# Patient Record
Sex: FEMALE | Race: BLACK OR AFRICAN AMERICAN | NOT HISPANIC OR LATINO | Employment: OTHER | URBAN - METROPOLITAN AREA
[De-identification: names, ages, dates, MRNs, and addresses within clinical notes are randomized per-mention and may not be internally consistent; named-entity substitution may affect disease eponyms.]

---

## 2018-11-19 ENCOUNTER — HOSPITAL ENCOUNTER (EMERGENCY)
Facility: HOSPITAL | Age: 53
Discharge: LEFT BEFORE DISCHARGE | End: 2018-11-19
Attending: EMERGENCY MEDICINE
Payer: OTHER GOVERNMENT

## 2018-11-19 VITALS
OXYGEN SATURATION: 97 % | RESPIRATION RATE: 18 BRPM | TEMPERATURE: 98 F | BODY MASS INDEX: 37.21 KG/M2 | SYSTOLIC BLOOD PRESSURE: 138 MMHG | WEIGHT: 210 LBS | DIASTOLIC BLOOD PRESSURE: 77 MMHG | HEIGHT: 63 IN | HEART RATE: 93 BPM

## 2018-11-19 DIAGNOSIS — Z48.89 ENCOUNTER FOR POSTOPERATIVE WOUND CARE: Primary | ICD-10-CM

## 2018-11-19 PROCEDURE — 99282 EMERGENCY DEPT VISIT SF MDM: CPT

## 2018-11-19 PROCEDURE — 99282 EMERGENCY DEPT VISIT SF MDM: CPT | Mod: ,,, | Performed by: EMERGENCY MEDICINE

## 2018-11-19 NOTE — ED TRIAGE NOTES
Presents to ER due to drainage coming from FLORINA drain site RLQ s/p tummy tuck on 10/30/18.  First called to see her surgeon but was sent here by him instead.  Patient's name and date of birth checked and is correct.    LOC: The patient is awake, alert, and oriented to place, time, situation. Affect is appropriate.  Speech is appropriate and clear.      APPEARANCE: Patient resting comfortably, reporting palpation, light headedness,  in no acute distress.  Patient is clean and well groomed.     SKIN: The skin is warm and dry; color consistent with ethnicity.  Patient has normal skin turgor and moist mucus membranes.  Skin intact; no breakdown or bruising noted.      MUSCULOSKELETAL: Patient moving upper and lower extremities without difficulty.  Denies weakness.      RESPIRATORY: Airway is open and patent. Respirations spontaneous, even, easy, and non-labored.  Patient has a normal effort and rate.  No accessory muscle use noted. Denies cough.  BS clear.     CARDIAC:  No peripheral edema noted. No complaints of chest pain.       ABDOMEN: Firm.  FLORINA drains bilateral lower quadrant draining serousang drainage.  Approximately 10ml noted in each bulb.     NEUROLOGIC: Eyes open spontaneously.  Behavior appropriate to situation.  Follows commands; facial expression symmetrical.  Purposeful motor response noted; normal sensation in all extremities.

## 2018-11-20 NOTE — ED NOTES
Patient states she does not want PIV and wants to go home, states if we will not take out the drains she does not want labs, update to physician who states he will be in to see her.

## 2018-11-20 NOTE — ED PROVIDER NOTES
Encounter Date: 11/19/2018       History     Chief Complaint   Patient presents with    Multiple Complaints     had tummy tuck in [place] oct 30 , i have 2 drains and L hip pain and it's numb , drainage around drains and smells     Source of history:  Patient    The patient had a tummy tuck performed by a surgeon in [place] on 10/30/18. She has Jorge-Ng drains to both sides of her lower abdomen.  She has been having diminishing drainage since her operation.  She had normal postoperative pain from her surgery initially that has been gradually improving.  She was taking Percocet 5/325 immediately following her operation.  She now takes Tylenol as needed.  The last dose of Tylenol was 3 days ago.  She denies fever, chills, nausea and vomiting. She denies urinary symptoms. She is having discomfort to her abdomen at the sites of the FLORINA drains.  She contacted her surgeon in [place] and was instructed to come to the ED and have her drains removed.           Review of patient's allergies indicates:  No Known Allergies  History reviewed. No pertinent past medical history.  Past Surgical History:   Procedure Laterality Date    ABDOMINAL SURGERY      HYSTERECTOMY       History reviewed. No pertinent family history.  Social History     Tobacco Use    Smoking status: Current Some Day Smoker     Packs/day: 0.25     Years: 5.00     Pack years: 1.25     Types: Cigarettes    Smokeless tobacco: Never Used   Substance Use Topics    Alcohol use: Yes    Drug use: No     Review of Systems   Constitutional: Negative for chills and fever.   Eyes: Negative for visual disturbance.   Respiratory: Negative for shortness of breath.    Cardiovascular: Negative for chest pain.   Gastrointestinal: Negative for abdominal pain, blood in stool, constipation, diarrhea, nausea and vomiting.   Endocrine: Negative for polydipsia and polyuria.   Genitourinary: Negative for difficulty urinating, dysuria and frequency.   Musculoskeletal: Negative for  arthralgias and myalgias.   Skin:        (+) discomfort to skin of abdomen at sites of FLORINA drains   Neurological: Negative for dizziness, syncope, weakness, light-headedness, numbness and headaches.       Physical Exam     Initial Vitals [11/19/18 1643]   BP Pulse Resp Temp SpO2   138/77 93 18 98.4 °F (36.9 °C) 97 %      MAP       --         Physical Exam    Constitutional: She appears well-developed and well-nourished. She is not diaphoretic. No distress.   HENT:   Mouth/Throat: Oropharynx is clear and moist.   Eyes: Conjunctivae are normal. No scleral icterus.   Cardiovascular: Normal rate, regular rhythm and normal heart sounds. Exam reveals no gallop and no friction rub.    No murmur heard.  Pulmonary/Chest: Breath sounds normal. No respiratory distress. She has no wheezes. She has no rhonchi. She has no rales.   Abdominal: She exhibits no distension. There is no tenderness.   The skin to the lower abdominal wall is mildly hypertrophied and hyperpigmented. There are no lacerations, abrasions, or other skin lesions. The skin is not hot to touch. There is no tenderness to the skin. There are FLORINA drains to both sides of the lower abdomen. There is not erythema or drainage around the tubes at the insertion sites. Each suction bulb has less than 25 cc of serosanguinous drainage.   Musculoskeletal: Normal range of motion. She exhibits no edema or tenderness.   Neurological: She is alert and oriented to person, place, and time. She has normal strength. No cranial nerve deficit or sensory deficit. GCS eye subscore is 4. GCS verbal subscore is 5. GCS motor subscore is 6.   Skin: Skin is warm and dry. No pallor.         ED Course   Procedures  Labs Reviewed   CBC W/ AUTO DIFFERENTIAL   COMPREHENSIVE METABOLIC PANEL   URINALYSIS, REFLEX TO URINE CULTURE          Imaging Results    None          Medical Decision Making:   History:   Old Medical Records: I decided to obtain old medical records.  Medical decision making:  This  patient was evaluated for evaluation of bilateral FLORINA drains to her lower abdomen that have been in place since tummy tuck operation performed at an outside facility late last month.  The patient is requesting that the drain to be removed.  Her surgery was not performed here.  It was performed by a provider in [place].  She lives here in [place].  She traveled to [place] just to have the surgery performed.  She states that no follow-up appointment was made by the provider who performed the operation.  She was told that she could follow up with a physician here in [place] for postoperative care and eventual removal of the drains.  Today, she is afebrile with stable vital signs.  She has no clinical signs of dehydration.  Her abdomen is soft and nontender. She is continuing to have less than 25 cc of drainage from each site daily.  This has decreased significantly from the amount of drainage that she was having immediately following her surgery.  My plan of care was to obtain basic labs to make sure that she had no leukocytosis or other concerning findings for a serious acute infection and then having her follow up outpatient for definitive care.  The patient does not wish to have labs performed at this time.  She states that she will travel to [place] to have her issues addressed by the surgeon who performed the operation.                      Clinical Impression:   The encounter diagnosis was Encounter for postoperative wound care.      Disposition:   Disposition: AMA  Condition: Stable                        Tee Crawford III, MD  11/19/18 6005

## 2022-02-17 ENCOUNTER — OFFICE VISIT (OUTPATIENT)
Dept: GASTROENTEROLOGY | Facility: CLINIC | Age: 57
End: 2022-02-17
Payer: MEDICARE

## 2022-02-17 DIAGNOSIS — Z86.010 PERSONAL HISTORY OF COLONIC POLYPS: ICD-10-CM

## 2022-02-17 DIAGNOSIS — Z01.812 PRE-PROCEDURE LAB EXAM: ICD-10-CM

## 2022-02-17 DIAGNOSIS — D50.9 IRON DEFICIENCY ANEMIA, UNSPECIFIED IRON DEFICIENCY ANEMIA TYPE: Primary | ICD-10-CM

## 2022-02-17 PROCEDURE — 99202 OFFICE O/P NEW SF 15 MIN: CPT | Mod: PBBFAC,PO | Performed by: INTERNAL MEDICINE

## 2022-02-17 PROCEDURE — 99999 PR PBB SHADOW E&M-NEW PATIENT-LVL II: ICD-10-PCS | Mod: PBBFAC,,, | Performed by: INTERNAL MEDICINE

## 2022-02-17 PROCEDURE — 99204 PR OFFICE/OUTPT VISIT, NEW, LEVL IV, 45-59 MIN: ICD-10-PCS | Mod: S$PBB,,, | Performed by: INTERNAL MEDICINE

## 2022-02-17 PROCEDURE — 99999 PR PBB SHADOW E&M-NEW PATIENT-LVL II: CPT | Mod: PBBFAC,,, | Performed by: INTERNAL MEDICINE

## 2022-02-17 PROCEDURE — 99204 OFFICE O/P NEW MOD 45 MIN: CPT | Mod: S$PBB,,, | Performed by: INTERNAL MEDICINE

## 2022-02-17 RX ORDER — SODIUM, POTASSIUM,MAG SULFATES 17.5-3.13G
1 SOLUTION, RECONSTITUTED, ORAL ORAL DAILY
Qty: 1 KIT | Refills: 0 | Status: SHIPPED | OUTPATIENT
Start: 2022-02-17 | End: 2022-02-19

## 2022-02-17 RX ORDER — SODIUM, POTASSIUM,MAG SULFATES 17.5-3.13G
1 SOLUTION, RECONSTITUTED, ORAL ORAL DAILY
Qty: 1 KIT | Refills: 0 | OUTPATIENT
Start: 2022-02-17 | End: 2022-02-17

## 2022-02-17 NOTE — PATIENT INSTRUCTIONS
SUPREP Instructions    [facility] [address]  Clinic Office [phone]  Endoscopy Lab [phone]    You are scheduled for a Colonoscopy with Dr. Michelle  on 3/22/22 at [facility] in [place].    Check in at the Hospital -1st floor, Information desk.   Call [phone] to reschedule.     An adult friend/family member must come with you to drive you home.  You cannot drive, take a taxi, Uber/Lyft or bus to leave the Endoscopy Center alone.  If you do not have someone to drive you home, your test will be cancelled.      Please follow the directions of your doctor if you take any pills that thin your blood. If you take these meds: Aggrenox, Brilinta, Effient, Eliquis, Lovenox, Plavix, Pletal, Pradaxa, Ticilid, Xarelto or Coumadin, let the doctor's office know.     DON'T: On the morning of the test do not take insulin or pills for diabetes.      DO: On the morning of the test, do take any pills for blood pressure, heart, anti-rejection and or seizures with a small sip of water. Bring any inhalers with you.     To have a good prep, you must follow these instructions - please do not use the directions from the pharmacy.     The doctor will send a prescription for the SUPREP.      The Day Before the test:     You can only drink CLEAR LIQUIDS the whole day before your test.  You can't eat any food for the whole day.     You CAN have:  o Water, Coffee or decaf coffee (no milk or cream)  o Tea  o Soft drinks - regular and sugar free  o Jell-O (green or yellow)  o Apple Juice, grape juice, white cranberry juice  o Gatorade, Power Aid, Crystal Light, Shady Aid  o Lemonade and Limeade  o Bouillon, clear soup  o Snowball, popsicles  o YOU CAN'T DRINK ANYTHING RED  o YOU CAN'T DRINK ALCOHOL  ONLY DRINK WHAT IS ON THE LIST       At 5 pm the night before your test:    o Pour the 1st bottle of SUPREP into the cup provided in the box. Add water to the line on the cup and mix well.  Drink  the whole cup and then drink 2 more full cups of water over 1 hour.  - This can be easier to drink if it is cold. You can mix it 20 minutes ahead of time and place in the refrigerator before you drink it.  You must drink it within 30-45 minutes of mixing it.  Do NOT pour the drink over ice.  You can drink it with a straw.    The Day of the test - We will call you 2 days before your test to tell you what time to get there.     5 hours before you come to the hospital (this may be in the middle of the night)  o Pour the 2nd bottle of SUPREP into the cup provided in the box. Add water to the line on the cup and mix well.  Drink the whole cup and then drink 2 more full cups of water over 1 hour.  - It might be easier to drink if it is cold. You can mix it 20 minutes ahead of time and place in the refrigerator before you drink it.  You must drink it within 30-45 minutes of mixing it.  Do NOT pour the drink over ice.  You can drink it with a straw.    o YOU CAN'T EAT OR DRINK ANYTHING ELSE ONCE YOU FINISH THE PREP    o Leave all valuables and jewelry at home. You will be at the hospital for 2-4 hours.    o Call the Endoscopy department at [phone] with any questions about your procedure.

## 2022-02-17 NOTE — PROGRESS NOTES
GASTROENTEROLOGY CLINIC NOTE    Reason for visit: The primary encounter diagnosis was Iron deficiency anemia, unspecified iron deficiency anemia type. Diagnoses of Personal history of colonic polyps and Pre-procedure lab exam were also pertinent to this visit.  Referring provider/PCP: 's Administration (Inactive)    HPI:  Lynda Escobar is a 56 y.o. female here today for here as [facility] for [facility]. New patient.    Ongoing anemia. hgb reportedly around 10, from about 8 months ago it was near 12. Records reviewed that were scanned into media. No abd pain, No blood in stool. No other bleeding sources. She notes intermittent bilateral flank pain, no specific timing. No problems with appetite, no radiating symptoms.  She is wanting to undergo tummy tuck surgery.        Prior Endoscopy:  EGD: none  Colon:  She thinks about 3 or more years ago and was told polyps    (Portions of this note were dictated using voice recognition software and may contain dictation related errors in spelling/grammar/syntax not found on text review)    Review of Systems   Constitutional: Negative for fever and malaise/fatigue.   Respiratory: Negative for cough and shortness of breath.    Cardiovascular: Negative for chest pain and palpitations.   Gastrointestinal: Negative for abdominal pain, blood in stool, nausea and vomiting.   Neurological: Negative for dizziness and headaches.       Past Medical History: has no past medical history on file.    Past Surgical History: has a past surgical history that includes Abdominal surgery and Hysterectomy.    Family History:family history is not on file.    Allergies: Review of patient's allergies indicates:  No Known Allergies    Social History: reports that she has been smoking cigarettes. She has a 1.25 pack-year smoking history. She has never used smokeless tobacco. She reports current alcohol use. She reports that she does not use drugs.    Home medications:   Current Outpatient  Medications on File Prior to Visit   Medication Sig Dispense Refill    potassium chloride SA (K-DUR,KLOR-CON) 10 MEQ tablet Take 20 mEq by mouth once daily.      amlodipine (NORVASC) 5 MG tablet Take 5 mg by mouth once daily.      hydrochlorothiazide (HYDRODIURIL) 12.5 MG Tab Take 12.5 mg by mouth once daily.      pantoprazole (PROTONIX) 40 MG tablet Take 40 mg by mouth once daily.       No current facility-administered medications on file prior to visit.       Vital signs:  There were no vitals taken for this visit.    Physical Exam  Vitals reviewed.   Constitutional:       General: She is not in acute distress.  HENT:      Head: Normocephalic and atraumatic.   Eyes:      General: No scleral icterus.     Conjunctiva/sclera: Conjunctivae normal.   Skin:     General: Skin is warm.      Coloration: Skin is not pale.      Findings: No rash.   Neurological:      Mental Status: She is oriented to person, place, and time.      Gait: Gait normal.   Psychiatric:         Mood and Affect: Mood normal.         Behavior: Behavior normal.         I have reviewed prior labs, imaging, notes from last month    Assessment:  1. Iron deficiency anemia, unspecified iron deficiency anemia type    2. Personal history of colonic polyps    3. Pre-procedure lab exam        Plan:  Orders Placed This Encounter    COVID-19 Routine Screening    sodium,potassium,mag sulfates (SUPREP BOWEL PREP KIT) 17.5-3.13-1.6 gram SolR    Case Request Endoscopy: EGD (ESOPHAGOGASTRODUODENOSCOPY), COLONOSCOPY     egd and colon  egd for iron def  Colon for hx of polyps    Advised her to have her PCP check iron and routine CBC in future.  Advised her to return to PCP for further eval of bilateral flank pain and ongoing kidney issues.    RTC     Mundo Michelle MD  [facility]

## 2022-03-15 ENCOUNTER — TELEPHONE (OUTPATIENT)
Dept: GASTROENTEROLOGY | Facility: CLINIC | Age: 57
End: 2022-03-15
Payer: OTHER GOVERNMENT

## 2022-03-15 NOTE — TELEPHONE ENCOUNTER
----- Message from Irma Choudhary sent at 3/15/2022 12:15 PM CDT -----  Needs advice from nurse:      Who Called:pt  Regarding:[facility] is saying you need to request that the colonoscopy is needed/please call [facility] at /patient is scheduled for 3/22  Would the patient rather a call back or [facility]IA [facility]?  Best Call Back number:[phone]  Additional Info:      
Unable to reach [facility].   
Former smoker

## 2022-03-18 ENCOUNTER — TELEPHONE (OUTPATIENT)
Dept: GASTROENTEROLOGY | Facility: HOSPITAL | Age: 57
End: 2022-03-18
Payer: OTHER GOVERNMENT

## 2022-03-18 ENCOUNTER — TELEPHONE (OUTPATIENT)
Dept: ENDOSCOPY | Facility: HOSPITAL | Age: 57
End: 2022-03-18
Payer: OTHER GOVERNMENT

## 2022-03-18 DIAGNOSIS — Z86.010 PERSONAL HISTORY OF COLONIC POLYPS: Primary | ICD-10-CM

## 2022-03-18 RX ORDER — POLYETHYLENE GLYCOL 3350, SODIUM SULFATE, SODIUM CHLORIDE, POTASSIUM CHLORIDE, SODIUM ASCORBATE, AND ASCORBIC ACID 7.5-2.691G
2000 KIT ORAL ONCE
Qty: 1 KIT | Refills: 0 | Status: SHIPPED | OUTPATIENT
Start: 2022-03-18 | End: 2022-03-18

## 2022-03-18 NOTE — TELEPHONE ENCOUNTER
unable to get prep will speak to Dr. Michelle and fax RX today and reach out to patient on Monday

## 2022-09-02 ENCOUNTER — TELEPHONE (OUTPATIENT)
Dept: ORTHOPEDICS | Facility: CLINIC | Age: 57
End: 2022-09-02
Payer: OTHER GOVERNMENT

## 2022-09-02 NOTE — TELEPHONE ENCOUNTER
I called Mrs Escobar @ 10 : 30 am  , she had a 10 am appt with Dr Yuen   she thought it was on Sept. 4th  she was rescheduled to 10/14 she was sorry she missed it , no problem

## 2022-09-29 ENCOUNTER — TELEPHONE (OUTPATIENT)
Dept: ORTHOPEDICS | Facility: CLINIC | Age: 57
End: 2022-09-29
Payer: OTHER GOVERNMENT

## 2022-09-29 NOTE — TELEPHONE ENCOUNTER
We spoke   informed her Oct 14th   appointment with Dr Yuen has to be canceled  he is transitioning back to [facility]  I have the [facility] request for Service form filled out for Dr TIPTON to sign  then the [facility] will issue another referral to DR TIPTON for [facility]   she voiced understanding